# Patient Record
Sex: MALE | Race: WHITE | URBAN - METROPOLITAN AREA
[De-identification: names, ages, dates, MRNs, and addresses within clinical notes are randomized per-mention and may not be internally consistent; named-entity substitution may affect disease eponyms.]

---

## 2024-05-21 ENCOUNTER — TELEPHONE (OUTPATIENT)
Dept: SURGERY | Facility: CLINIC | Age: 39
End: 2024-05-21

## 2024-05-21 ENCOUNTER — OFFICE VISIT (OUTPATIENT)
Dept: SURGERY | Facility: CLINIC | Age: 39
End: 2024-05-21

## 2024-05-21 VITALS
SYSTOLIC BLOOD PRESSURE: 113 MMHG | HEART RATE: 60 BPM | HEIGHT: 65 IN | WEIGHT: 174 LBS | BODY MASS INDEX: 28.99 KG/M2 | DIASTOLIC BLOOD PRESSURE: 73 MMHG

## 2024-05-21 DIAGNOSIS — M47.26 OTHER SPONDYLOSIS WITH RADICULOPATHY, LUMBAR REGION: Primary | ICD-10-CM

## 2024-05-21 NOTE — PATIENT INSTRUCTIONS
Refill policies:    Allow 2-3 business days for refills; controlled substances may take longer.  Contact your pharmacy at least 5 days prior to running out of medication and have them send an electronic request or submit request through the “request refill” option in your [employer] account.  Refills are not addressed on weekends; covering physicians do not authorize routine medications on weekends.  No narcotics or controlled substances are refilled after noon on Fridays or by on call physicians.  By law, narcotics must be electronically prescribed.  A 30 day supply with no refills is the maximum allowed.  If your prescription is due for a refill, you may be due for a follow up appointment.  To best provide you care, patients receiving routine medications need to be seen at least once a year.  Patients receiving narcotic/controlled substance medications need to be seen at least once every 3 months.  In the event that your preferred pharmacy does not have the requested medication in stock (e.g. Backordered), it is your responsibility to find another pharmacy that has the requested medication available.  We will gladly send a new prescription to that pharmacy at your request.    Scheduling Tests:    If your physician has ordered radiology tests such as MRI or CT scans, please contact Central Scheduling at [phone] right away to schedule the test.  Once scheduled, the [facility] Centralized Referral Team will work with your insurance carrier to obtain pre-certification or prior authorization.  Depending on your insurance carrier, approval may take 3-10 days.  It is highly recommended patients assure they have received an authorization before having a test performed.  If test is done without insurance authorization, patient may be responsible for the entire amount billed.      Precertification and Prior Authorizations:  If your physician has recommended that you have a procedure or additional testing performed the [facility]  Centralized Referral Team will contact your insurance carrier to obtain pre-certification or prior authorization.    You are strongly encouraged to contact your insurance carrier to verify that your procedure/test has been approved and is a COVERED benefit.  Although the [facility] Centralized Referral Team does its due diligence, the insurance carrier gives the disclaimer that \"Although the procedure is authorized, this does not guarantee payment.\"    Ultimately the patient is responsible for payment.   Thank you for your understanding in this matter.  Paperwork Completion:  If you require FMLA or disability paperwork for your recovery, please make sure to either drop it off or have it faxed to our office at [phone]. Be sure the form has your name and date of birth on it.  The form will be faxed to our Forms Department and they will complete it for you.  There is a 25$ fee for all forms that need to be filled out.  Please be aware there is a 10-14 day turnaround time.  You will need to sign a release of information (JOSTIN) form if your paperwork does not come with one.  You may call the Forms Department with any questions at [phone].  Their fax number is [phone].      Dr. Mares recommends the following:    Start physical therapy for 6 weeks. To schedule Physical Therapy at any of the [facility], please call   [phone].     [facility] in [facility]  [address] [place] [address]            [facility] in [place]  [address] [place]  [address]    [facility] in [place]  [address] [place] [address]              [facility] in [place]  [address] [place] [address]    [facility] in [place]  [address] [place] [address]            **If you would prefer to do physical therapy at a different facility ([facility], [facility], etc.), please request an \"External\" physical therapy order from  your provider    2. Schedule an appointment with Physiatrist, Dr. Alex Behar by calling [phone] to discuss possible lumbar injections.     Follow up with Dr. Mares in 6 weeks.

## 2024-05-21 NOTE — H&P
MYA RED M.D., F.A.A.N.S.     of Neurosurgery  [facility]  Board Certified Neurosurgeon                              [facility]        [facility]      [address]  [address] [place] [id number]    PHONE  [phone]          FAX  [phone]    [email or web address]    Located within [facility] MEDICAL [facility], [facility], [facility]     OFFICE CONSULTATION          Cristobal Garcia  : 1985   MRN: [id number]    PCP: No primary care provider on file.  Referring Provider: No ref. provider found     Insurance: Payor: WORKERS COMP / Plan: WORKERS COMP / Product Type: *No Product type* /            Date of Consult:  2024    Reason for Consultation:  Our patient has been referred to our office for evaluation of: Lower back and bilateral lower extremity pain      History of Present Illness:  Cristobal Garcia is a a(n) right-handed, 39 year old, male.  This is a very pleasant gentleman who presents after a work-related accident on  which led to the acute onset of lower back and bilateral lower extremity discomfort.  He reports that he started physical therapy and had a nonsteroidal anti-inflammatory injection in the emergency room which was mildly helpful.  He continues to be symptomatic and is utilizing a cane for safety.  He denies any focal sensory changes or any focal weakness in his lower extremities.  He states that the pain radiates into the bilateral thighs posteriorly but does not really descend below.  He denies any acute bowel or bladder changes.        History:  No past medical history on file.   No past surgical history on file.  No family history on file.   Social History     Socioeconomic History    Marital status: Single     Spouse name: Not on file    Number of children: Not on file    Years of education: Not on file    Highest education level: Not on file   Occupational History    Not  on file   Tobacco Use    Smoking status: Not on file    Smokeless tobacco: Not on file   Substance and Sexual Activity    Alcohol use: Not on file    Drug use: Not on file    Sexual activity: Not on file   Other Topics Concern    Not on file   Social History Narrative    Not on file     Social Determinants of Health     Financial Resource Strain: Not on file   Food Insecurity: Not on file   Transportation Needs: Not on file   Physical Activity: Not on file   Stress: Not on file   Social Connections: Not on file   Housing Stability: Low Risk  (1/29/2022)    Received from [facility]    Housing Stability     Mortgage Payment Concerns?: Not on file     Number of Places Lived in the Last Year: Not on file     Unstable Housing?: Not on file        Allergies:  Not on File    Medications:  No current outpatient medications on file.        Review of Systems:  A 10-point system was reviewed.  Pertinent positives and negatives are noted in HPI.      Physical Exam:  /73   Pulse 60   Ht 65\"   Wt 174 lb (78.9 kg)   BMI 28.96 kg/m²        Neurological Exam:    Motor Strength:  5/5 AMG and symmetric    Sensation to light touch:  Intact and symmetric in to light touch in the lower extremities    DTRs:  2+/4 in patellar distribution    Long tract signs:  Negative roberts  Negative babinski  Negative clonus      Abdomen:  Soft, non-distended, non-tender, with no rebound or guarding.  No peritoneal signs.     Extremities:  Non-tender, no lower extremity edema noted.      Labs:  CBC:  No results found for: \"WBC\", \"HGB\", \"HEMOGLOBIN\", \"HCT\", \"MCV\", \"PLT\"   BMG:   No results found for: \"GLUF\", \"NA\", \"K\", \"CO2\", \"CL\", \"BUN\", \"CREATININE\"   INR:   No results found for: \"INR\", \"PROTIME\"     Diagnostics:  I reviewed an MRI of the lumbar spine without contrast.  There is proper alignment of the lumbar vertebral bodies with some loss of regional lumbar lordosis.  There is degenerative disc disease at L4-5 with loss  of high signal and a left-sided disc herniation causing significant left lateral recess stenosis.     Diagnosis:  1. Other spondylosis with radiculopathy, lumbar region        Assessment/Plan:  Cristobal Garcia is a a(n) 39 year old, male, presents with an L4-5 disc herniation and significant left lateral recess stenosis at L4-5.  His symptomatology at this point in time is predominantly mechanical and I have discussed with him that physical therapy and over-the-counter nonsteroidal anti-inflammatories should be the mainstay treatment at this time.  We will also provide him with a referral to Dr. Behar for a possible epidural steroid injection.  We will provide a physical therap order and have him follow-up in 6 weeks.  If he continues to be symptomatic he may be a candidate for a surgical intervention at that time.  He is off work for now.    All questions and concerns were addressed. We appreciate the opportunity to participate in the care of this patient. Please do not hesitate to call our office ([phone]) with any issues.   This report will be submitted to the referring provider.          Lawson Mares M.D., F.A.A.N.S.    5/21/2024  11:13 AM    This note has been dictated utilizing voice recognition software. Unfortunately, this may lead to occasional typographical errors. If there are any questions regarding this, please do not hesitate to contact our office.

## 2024-05-21 NOTE — TELEPHONE ENCOUNTER
Patient came in office today for office visit with Dr. Mares. Patient brought in disc, MRI L/Spine, LBP Radiculopathy dated on 5/14/24. Disc was downloaded via PAC's and handed back to patient.

## 2024-05-21 NOTE — PROGRESS NOTES
Most recent imaging dated on: Disc via PAC's.   Pain Level: 8/10. Patient states that they are having pain located on their lower back and both legs.   Numbness / Tingling: Patient denies numbness and tingling.   Physical Therapy / Injections: Patient has been compliant with Physical Therapy / Injections, 1.

## 2024-05-28 ENCOUNTER — TELEPHONE (OUTPATIENT)
Dept: SURGERY | Facility: CLINIC | Age: 39
End: 2024-05-28

## 2024-05-28 NOTE — TELEPHONE ENCOUNTER
[place] office received a fax from [employer] requesting medical records.     Claim # [id number]  Date of Injury: 04/26/2024    Documents successfully faxed to our Medical records office at [phone].

## 2024-07-18 ENCOUNTER — OFFICE VISIT (OUTPATIENT)
Dept: PHYSICAL MEDICINE AND REHAB | Facility: CLINIC | Age: 39
End: 2024-07-18
Payer: OTHER MISCELLANEOUS

## 2024-07-18 VITALS — BODY MASS INDEX: 28.99 KG/M2 | HEIGHT: 65 IN | WEIGHT: 174 LBS

## 2024-07-18 DIAGNOSIS — M51.16 LUMBAR DISC HERNIATION WITH RADICULOPATHY: Primary | ICD-10-CM

## 2024-07-18 PROCEDURE — 99204 OFFICE O/P NEW MOD 45 MIN: CPT | Performed by: PHYSICAL MEDICINE & REHABILITATION

## 2024-07-18 RX ORDER — METHYLPREDNISOLONE 4 MG/1
TABLET ORAL
Qty: 1 EACH | Refills: 0 | Status: SHIPPED | OUTPATIENT
Start: 2024-07-18

## 2024-07-18 NOTE — H&P
History and Physical    C/C:   Chief Complaint   Patient presents with    New Patient     New patient is here with complaints of back pain. Was referred by Dr. Mares.Reports pain to be a constant aching pain and comes whenever he's active and or standing up. States he has to sit or less the pain will then comes. Pain started April 25th due to an injury while at work,( pulling boxes) and thinks he pulled something. Not taking any pain meds or muscle relaxer's. See's a chiropractor.      HPI: 39-year-old male presents with back pain that occurred after a work-related incident on 4/25/24. 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 No previous low back pain prior to the work related incident. Currently in physical therapy with some improvement. Pain with walking. Right sided buttock/lateral thigh pain with walking. Back pain increases with sitting.  Not having any left leg symptoms.  No numbness or tingling in the leg or foot.  No weakness. Has been off work since the incident. Referred by Dr. Mares for consideration of epidural steroid injection. Has not been able to follow up with Dr. Mares due to lack of workers' comp approval. Does physical therapy through a chiropractor's office.     Allergies: NKDA    Pertinent ROS: Denies fever, chills, unintended weight loss, night sweats     Physical exam:  Ht 65\"   Wt 174 lb (78.9 kg)   BMI 28.96 kg/m²     Lumbar spine exam:    No skin rash lumbar/upper sacral region  Mild low pain with lumbar flexion. No pain with lumbar extension.  No tenderness to palpation bilateral lumbar paraspinals. No ttp bilateral PSIS.  5/5 LE strength b/l in HF, KE, ADF, EHL, ankle eversion, PF  SILT b/l LE  2/4 quad, gastrocs reflexes b/l  Seated slump test provokes back pain only  SLR left results in left leg pain. SLR right results in midline low back pain    Imaging: MRI lumbar spine dated 5/14/2024 independently reviewed as was report. Left paracentral L4-5  protrusion, with bright T2 signal of the posterior disc.    Assessment and plan:  Lumbar disc herniation with radiculopathy    Has been improving with PT. Though he has provoked pain on left SLR he does not report left leg pain, and reports right lateral leg pain which is unexpected given the location of the disc herniation.  We discussed epidural steroid injection and the risks, including bleeding, infection, nerve injury, HA; elects to proceed. He is very much wanting to avoid a procedure. He was prescribed a medrol dosepack and 4 more weeks of physical therapy. If no improvement we can revisit L5-S1 interlaminar epidural steroid injection under fluoroscopy.     F/u in 2-4 weeks. Since he has been unable to follow up with Dr. Mares I gave him a note to keep him off work until he sees me for follow up.     Demarco Dixon DO  Physical Medicine and Rehabilitation  [facility]

## 2024-07-23 ENCOUNTER — TELEPHONE (OUTPATIENT)
Dept: PHYSICAL MEDICINE AND REHAB | Facility: CLINIC | Age: 39
End: 2024-07-23

## 2024-07-31 ENCOUNTER — TELEPHONE (OUTPATIENT)
Dept: PHYSICAL MEDICINE AND REHAB | Facility: CLINIC | Age: 39
End: 2024-07-31

## 2024-07-31 NOTE — TELEPHONE ENCOUNTER
Rosario from Dr. Duval's office (Dr. Erick Duval-[facility]ropractor) calling to request an extension letter. Patient just started PT on Monday - 07/29/2024. Dr. Duval's office is asking for a 2 week extension to be off of work from this date above. Could not find the letter Dr. Dixon wrote to be off of work. However, Dr. Dixon stated in his LOV note: \"Since he has been unable to follow up with Dr. Mares I gave him a note to keep him off work until he sees me for follow up.\"    Once letter is completed, please send to Fax: [phone]

## 2024-08-01 NOTE — TELEPHONE ENCOUNTER
OK to extend patient to be off work until 8/12; please have him follow up with me sometime that same week.

## 2024-08-08 ENCOUNTER — TELEPHONE (OUTPATIENT)
Dept: PHYSICAL MEDICINE AND REHAB | Facility: CLINIC | Age: 39
End: 2024-08-08

## 2024-08-13 ENCOUNTER — OFFICE VISIT (OUTPATIENT)
Dept: PHYSICAL MEDICINE AND REHAB | Facility: CLINIC | Age: 39
End: 2024-08-13
Payer: OTHER MISCELLANEOUS

## 2024-08-13 VITALS — HEIGHT: 65 IN | BODY MASS INDEX: 28.99 KG/M2 | WEIGHT: 174 LBS

## 2024-08-13 DIAGNOSIS — M51.16 LUMBAR DISC HERNIATION WITH RADICULOPATHY: Primary | ICD-10-CM

## 2024-08-13 RX ORDER — MELOXICAM 15 MG/1
15 TABLET ORAL DAILY
Qty: 14 TABLET | Refills: 0 | Status: SHIPPED | OUTPATIENT
Start: 2024-08-13

## 2024-08-13 NOTE — PROGRESS NOTES
Progress note    C/C:   Chief Complaint   Patient presents with    Follow - Up     LOV 7/18/24 pt is here for a follow up. No n/t. Not taking any pain  meds or muscle relaxers. Currently in physical therapy and states he's getting relief. Pain 7/10      HPI: 39 year old male presents for follow up. Improvement since last visit. He continues to have low back and right leg with prolonged sitting, back greater than leg pain. Has been in physical therapy since the last time I saw him. Finished last scheduled session of physical therapy. No n/t in the right leg.     Pertinent allergies: Not on File     Physical exam:  Ht 65\"   Wt 174 lb (78.9 kg)   BMI 28.96 kg/m²      Lumbar spine exam:    Mild low pain with lumbar flexion. No pain with lumbar extension.  No tenderness to palpation bilateral lumbar paraspinals. No ttp bilateral PSIS.  5/5 LE strength b/l in HF, KE, ADF, EHL, ankle eversion, PF  SILT b/l LE  2/4 quad, gastrocs reflexes b/l  Seated slump test provokes back pain only  SLR negative b/l    Imaging: No new imaging to review    Assessment and plan  Lumbar disc herniation with radiculopathy    Despite reporting 7/10 pain today he reports some reduction in right leg pain. At this point I recommend meloxicam 15 mg daily for 2 weeks, and work conditioning 3 to 4 days a week for 3 weeks.  If doing well enough can return the patient to light duty or full duty.  He should see me once the work conditioning is done. Remain off work for now.     Demarco Dixon DO  Physical Medicine and Rehabilitation  [facility]

## 2024-08-23 ENCOUNTER — MED REC SCAN ONLY (OUTPATIENT)
Dept: PHYSICAL MEDICINE AND REHAB | Facility: CLINIC | Age: 39
End: 2024-08-23

## 2024-09-09 ENCOUNTER — TELEPHONE (OUTPATIENT)
Dept: PHYSICAL MEDICINE AND REHAB | Facility: CLINIC | Age: 39
End: 2024-09-09

## 2024-09-09 NOTE — TELEPHONE ENCOUNTER
Pt referring physician calling to request work letter be faxed to [phone] once available. Pt does not have [employer] and not able to come in to get it.

## 2024-09-09 NOTE — TELEPHONE ENCOUNTER
Pt has questions regarding work note: pt says his back pain is still the same, therapy is not helping much with his pain. Work note currently says he can return to work on 9/13. However, his next follow up appointment is for 9/24. Pt is Colombian speaking. Please advise, thank you.

## 2024-09-09 NOTE — TELEPHONE ENCOUNTER
Incoming call from patient (language line solutions used at this time), patient has NOV: 09/24/2024. Pt reports compliance with PT & HEP. Pt reporting that during PT pt was made to carry 25lbs per UE Patient reports continued pain to lower back. Pt reporting that the Meloxicam was not helpful for the pain.      Pt requesting letter to remain off of work until he sees Dr. Dixon again on 09/24/2024 to maintain benefits for work.       Letter pended for Dr. Dixon's review/signature. Patient will  in office once letter is completed/signed.

## 2024-09-10 NOTE — TELEPHONE ENCOUNTER
Signed work letter uploaded to patient's chart.   Signed letter faxed to number given by patient previously: [phone].

## 2024-09-24 ENCOUNTER — OFFICE VISIT (OUTPATIENT)
Dept: PHYSICAL MEDICINE AND REHAB | Facility: CLINIC | Age: 39
End: 2024-09-24
Payer: OTHER MISCELLANEOUS

## 2024-09-24 ENCOUNTER — TELEPHONE (OUTPATIENT)
Dept: PHYSICAL MEDICINE AND REHAB | Facility: CLINIC | Age: 39
End: 2024-09-24

## 2024-09-24 VITALS — HEIGHT: 65 IN | WEIGHT: 175 LBS | BODY MASS INDEX: 29.16 KG/M2

## 2024-09-24 DIAGNOSIS — M51.16 LUMBAR DISC HERNIATION WITH RADICULOPATHY: Primary | ICD-10-CM

## 2024-09-24 PROCEDURE — 99214 OFFICE O/P EST MOD 30 MIN: CPT | Performed by: PHYSICAL MEDICINE & REHABILITATION

## 2024-09-24 RX ORDER — GABAPENTIN 300 MG/1
CAPSULE ORAL
Qty: 60 CAPSULE | Refills: 0 | Status: SHIPPED | OUTPATIENT
Start: 2024-09-24

## 2024-09-24 NOTE — PROGRESS NOTES
Progress note    C/C:   Chief Complaint   Patient presents with    Follow - Up     LOV: 8/13/2024 pt comes in with L side low back pain. Denies T/N. Denies weakness. Denies weakness. Rates pain 8/10. Denies medication. Completed work conditioning.      HPI: 39-year-old male presents for follow-up.  Underwent work conditioning.  He has seen some improvement. He still has difficulty with prolonged sitting due to back pain, but minimal to no leg pain. With walking and climbing stairs the back does not feel quite right. With forward bending has increased low back pain.     Pertinent allergies: Not on File     Physical exam:  Ht 65\"   Wt 175 lb (79.4 kg)   BMI 29.12 kg/m²      Lumbar spine exam:    No skin rash lumbar/upper sacral region  No pain with lumbar flexion. No pain with lumbar extension.  5/5 LE strength b/l  SILT b/l LE  2/4 quad, gastrocs reflexes b/l  Seated slump test + for low back pain    Imaging: No new imaging to review    Assessment and plan  Lumbar disc herniation with radiculopathy    The symptoms have for the most part centralized, but he still cannot tolerate tasks that load the lumbar disc.  This has been going on long enough where we should consider left L5 transforaminal epidural steroid injection under fluoroscopy. We discussed the risks of procedure, including bleeding, infection, nerve injury, HA; he is hesitant but elects to proceed.  In the meantime he will take gabapentin 300 mg nightly for 1 week, then 300 mg twice daily if no side effects and no benefit.  On the off chance that the gabapentin greatly helps he may postpone the procedure and come see me in 4 weeks time.  If no significant improvement with the gabapentin then he should we should proceed with left L5 transforaminal epidural steroid injection, and then he should be seen for follow-up 2 weeks after the procedure is done.    Demarco Dixon DO  Physical Medicine and Rehabilitation  [facility]

## 2024-09-24 NOTE — TELEPHONE ENCOUNTER
Initiated authorization for Left L5 transforaminal epidural steroid injection under fluoroscopy, local or local + oral valium. CPT/HCPCS 79851 dx:M51.16 to be done at [facility]    Clinicals along with the order have been faxed to the patients W/C Hedy esquivel to [phone]    Status: Pending  Case # [id number]  DOI: 4/26/24  Telephone# [phone]  Fax # [phone]

## 2024-09-24 NOTE — PATIENT INSTRUCTIONS
1) We will call you once we have insurance approval.   2) Hold off on physical therapy for now.   3) Come and see me for follow up 2 weeks after the injection is done.

## 2024-10-07 PROBLEM — M51.16 LUMBAR DISC HERNIATION WITH RADICULOPATHY: Status: ACTIVE | Noted: 2024-10-07

## 2024-10-08 ENCOUNTER — TELEPHONE (OUTPATIENT)
Dept: PHYSICAL MEDICINE AND REHAB | Facility: CLINIC | Age: 39
End: 2024-10-08

## 2024-10-08 NOTE — TELEPHONE ENCOUNTER
Patient is asking for a \"work note\" status.  He stated he asked Dr Dixon for it during his visit however pt did not receive it. Patient was told MD is on vacation and wont be back until Oct 11th.

## 2024-10-09 ENCOUNTER — TELEPHONE (OUTPATIENT)
Dept: PHYSICAL MEDICINE AND REHAB | Facility: CLINIC | Age: 39
End: 2024-10-09

## 2024-10-09 NOTE — TELEPHONE ENCOUNTER
Patient calling to report side effects from the injections.    - Patient had Left L5 transforaminal epidural steroid injection on 10/07/2024 with Dr. Dixon.    Starting on Monday, a couple hours after the injection - patient reporting long lasting hiccups, lasting for an hour or so and only subsiding for 5-6 minutes at a time. Patient also reporting a chest/sternum burning. Patient thought this was heartburn and took some Tums for this medication.        Informed patient that hiccups is a rare, but benign side effect of steroid injections and should subside after a couple of days. However, if they do not, feel free to give us a callback to report his symptoms. Informed patient that heartburn is a more common side effect, that if he continues to feel this, he may feel free to take Tums as instructed by the packaging. Patient verbalized understanding and was thankful.

## 2024-10-11 NOTE — TELEPHONE ENCOUNTER
Pt is calling back to follow up on work status note as this is needed in order to continue receiving pay. Please advise, thank you.

## 2024-10-14 NOTE — TELEPHONE ENCOUNTER
OK to provide work status update stating patient underwent procedure and is scheduled for follow up on 11/5. Should be off work until seen for scheduled follow up.

## 2024-10-14 NOTE — TELEPHONE ENCOUNTER
Work Letter created & signed by Dr. Dixon at this time.     Signed letter can be found in media tab.

## 2024-10-14 NOTE — TELEPHONE ENCOUNTER
S/w patient via [employer]. Patient informed this RN that he would like it faxed to #[phone]. Faxed at this time as requested.    Patient will also  this letter tomorrow at the . Signed letter placed at the  for patient .

## 2024-10-17 ENCOUNTER — TELEPHONE (OUTPATIENT)
Dept: PHYSICAL MEDICINE AND REHAB | Facility: CLINIC | Age: 39
End: 2024-10-17

## 2024-11-05 ENCOUNTER — OFFICE VISIT (OUTPATIENT)
Dept: PHYSICAL MEDICINE AND REHAB | Facility: CLINIC | Age: 39
End: 2024-11-05
Payer: OTHER MISCELLANEOUS

## 2024-11-05 VITALS — HEART RATE: 68 BPM | WEIGHT: 175 LBS | OXYGEN SATURATION: 97 % | BODY MASS INDEX: 29 KG/M2

## 2024-11-05 DIAGNOSIS — M51.16 LUMBAR DISC HERNIATION WITH RADICULOPATHY: Primary | ICD-10-CM

## 2024-11-05 PROCEDURE — 99214 OFFICE O/P EST MOD 30 MIN: CPT | Performed by: PHYSICAL MEDICINE & REHABILITATION

## 2024-11-05 NOTE — PROGRESS NOTES
Progress note    C/C:   Chief Complaint   Patient presents with    Follow - Up     10/07/24 Left L5 TFESI. 09/24/24 LOV. States 90% relief after injection. He still has pain when he is sitting on the floor. Denies t/n or burning. No pain. Mobic daily.       HPI: 39-year-old male presents for follow-up.  Underwent left L5 transforaminal epidural steroid injection under fluoroscopy on 10/7/2024. Significant improvement as per above. Has lifted objects about 20 pounds at home after injection, mild discomfort. Denies radiation into the bilateral lower extremities, no n/t in the legs or the feet. When sitting on the floor has increasing low back pain.     Pertinent allergies: Allergies[1]     Physical exam:  Pulse 68   Wt 175 lb (79.4 kg)   SpO2 97%   BMI 29.12 kg/m²      Lumbar spine exam:    No skin rash lumbar/upper sacral region  No pain with lumbar flexion. No pain with lumbar extension.  5/5 LE strength b/l in HF, KE, ADF, EHL, ankle eversion  SILT b/l LE  2/4 quad, gastrocs reflexes b/l  Seated slump test provokes low back pain only  SLR negative b/l    Imaging: No new imaging to review    Assessment and plan  Lumbar disc herniation with radiculopathy, improved    Improved, though still difficulty tolerating weight.  Recommend work conditioning for 3 weeks, follow-up afterwards.  Remain off work.    30 minutes spent precharting, conducting H&P, discussing treatment options, completing documentation.       [1] No Known Allergies

## 2025-01-28 ENCOUNTER — OFFICE VISIT (OUTPATIENT)
Dept: PHYSICAL MEDICINE AND REHAB | Facility: CLINIC | Age: 40
End: 2025-01-28
Payer: OTHER MISCELLANEOUS

## 2025-01-28 VITALS — WEIGHT: 175 LBS | BODY MASS INDEX: 29.16 KG/M2 | HEIGHT: 65 IN

## 2025-01-28 DIAGNOSIS — M54.16 RIGHT LUMBAR RADICULITIS: Primary | ICD-10-CM

## 2025-01-28 DIAGNOSIS — M51.16 LUMBAR DISC HERNIATION WITH RADICULOPATHY: ICD-10-CM

## 2025-01-28 PROCEDURE — 99214 OFFICE O/P EST MOD 30 MIN: CPT | Performed by: PHYSICAL MEDICINE & REHABILITATION

## 2025-01-28 RX ORDER — MELOXICAM 15 MG/1
15 TABLET ORAL DAILY
Qty: 14 TABLET | Refills: 0 | Status: SHIPPED | OUTPATIENT
Start: 2025-01-28

## 2025-01-28 NOTE — PROGRESS NOTES
Progress note    C/C:   Chief Complaint   Patient presents with    Follow - Up     LOV 11/5/24. F/U for low back pain. Pain 8/10. Admits N/T in R leg. Denies weakness. No pain meds. Pt hurt back more during last PT sessions.      HPI: 39 year old male presents for follow up. Has had worsening pain following work conditioning. Aggravated right lower back and leg pain down to the lateral aspect of the right lower leg with associated numbness and tingling that worsens with standing, walking, less so with sitting. No weakness.     Pertinent allergies: Allergies[1]     Physical exam:  There were no vitals taken for this visit.     Lumbar spine exam:    No skin rash lumbar/upper sacral region  mild pain with lumbar flexion. + pain with lumbar extension. Extension provokes right leg and lower back pain  No tenderness to palpation bilateral lumbar paraspinals. No ttp bilateral PSIS.  5/5 LE strength b/l in HF, KE, ADF, ankle inversion, ankle eversion, PF  SILT b/l LE  2/4 quad, gastrocs reflexes b/l  Facet loading +  SLR right results in back pain only    Imaging: No new imaging to review; that being said his previous MRI from April of 2024 demonstrated increased T2 signal within both facet joints, along with LEFT L4-5 paracentral protrusion.    Assessment and plan  Right lumbar radiculitis  Lumbar facet arthropathy    Aggravated right leg pain following work conditioning. Structurally there does not appear to be a cause for radicular symptoms on his MRI; that being said there is facet hypertrophy and increased signal within both facet joints. Recommend XR flex/ex views to assess for stability. In the meantime recommend meloxicam 15mg qDaily x14 days, and if no better L5-S1 interlaminar epidural steroid injection under fluoroscopy, local. Afterwards may need to consider FCE.     Demarco Dixon DO  Physical Medicine and Rehabilitation  [facility]       [1] No Known Allergies

## 2025-01-29 ENCOUNTER — TELEPHONE (OUTPATIENT)
Dept: PHYSICAL MEDICINE AND REHAB | Facility: CLINIC | Age: 40
End: 2025-01-29

## 2025-01-29 NOTE — TELEPHONE ENCOUNTER
Initiated authorization for L5-S1 interlaminar epidural steroid injection under fluoroscopy. CPT/[facility] 85392 dx:M54.16 to be done at [facility]     Order and clinicals have been faxed to the patients W/C agent:  Hedy Kim   Case # [id number]  DOI: 4/26/24  Telephone# [phone]  Fax # [phone]

## 2025-02-03 NOTE — TELEPHONE ENCOUNTER
Reviewed the approval letter via the Media tab.    Status: Approved  Reference/Authorization # [id number]  Authorization is not a guarantee of payment and may be subject to review once claim is submitted.

## 2025-02-10 PROBLEM — M54.16 RIGHT LUMBAR RADICULITIS: Status: ACTIVE | Noted: 2025-02-10

## 2025-02-13 ENCOUNTER — TELEPHONE (OUTPATIENT)
Dept: NEUROLOGY | Facility: CLINIC | Age: 40
End: 2025-02-13

## 2025-02-19 ENCOUNTER — TELEPHONE (OUTPATIENT)
Dept: PHYSICAL MEDICINE AND REHAB | Facility: CLINIC | Age: 40
End: 2025-02-19

## 2025-02-25 ENCOUNTER — OFFICE VISIT (OUTPATIENT)
Dept: PHYSICAL MEDICINE AND REHAB | Facility: CLINIC | Age: 40
End: 2025-02-25
Payer: OTHER MISCELLANEOUS

## 2025-02-25 VITALS — BODY MASS INDEX: 29.16 KG/M2 | WEIGHT: 175 LBS | HEIGHT: 65 IN

## 2025-02-25 DIAGNOSIS — M47.816 LUMBAR FACET ARTHROPATHY: Primary | ICD-10-CM

## 2025-02-25 PROCEDURE — 99214 OFFICE O/P EST MOD 30 MIN: CPT | Performed by: PHYSICAL MEDICINE & REHABILITATION

## 2025-02-25 NOTE — PROGRESS NOTES
Progress note    C/C:   Chief Complaint   Patient presents with    Follow - Up     LOV 1/28/25. L5-S1 interlaminar epidural steroid injection on 2/10/25. Pt claims very little improvement following injection. Pain 8/10. Denies N/T, weakness. Pt takes tylenol prn. No recent PT.      HPI: 39 year old male presents for follow up. On further questioning while having low back pain the right leg pain went away following L5-S1 ILESI. Continues to have constant right lower back pain. Increases with prolonged sitting, movement. Has not been walking. Voices frustration with the workers' comp process and wants to return to work, but concerned of provoking increasing lower back pain and is seeking further treatment.     Pertinent allergies: Allergies[1]     Physical exam:  Ht 65\"   Wt 175 lb (79.4 kg)   BMI 29.12 kg/m²      Lumbar spine exam:    + pain with lumbar flexion. + pain with lumbar extension. Both equally painful.   5/5 LE strength b/l  SILT b/l LE  2/4 quad, gastrocs reflexes b/l  Facet loading + b/l  Seated slump test negative  SLR negative b/l    Imaging: MRI lumbar spine from May 2024 was re-reviewed. There is increased T2 facet signal of the L3-4, L4-5 facet joints, along with left L4-5 paracentral protrusion.     Assessment and plan  Lumbar facet arthropathy  Right lumbar radiculitis, radicular symptoms resolved    For the remaining axial low back pain recommend bilateral L3-4, L4-5 facet joint steroid injections under fluoroscopy, local with or without oral valium. We discussed the risks of procedure, including bleeding, infection, nerve injury, HA; elects to proceed. After injection is done can consider returning him to work; remain off work for now.     Follow up for injection.     Demarco Dixon DO  Physical Medicine and Rehabilitation  [facility]       [1] No Known Allergies

## 2025-02-26 ENCOUNTER — TELEPHONE (OUTPATIENT)
Dept: PHYSICAL MEDICINE AND REHAB | Facility: CLINIC | Age: 40
End: 2025-02-26

## 2025-02-26 NOTE — TELEPHONE ENCOUNTER
Initiated authorization for Bilateral L3-4, L4-5 facet joint steroid injections under fluoroscopy. [facility]'s 2 with the patients W/C agent:    Hedy Tiffanyteresa  Case # [id number]  DOI: 4/26/24  Telephone# [phone]  Fax # [phone]  Status: Pending

## 2025-03-12 ENCOUNTER — TELEPHONE (OUTPATIENT)
Dept: PHYSICAL MEDICINE AND REHAB | Facility: CLINIC | Age: 40
End: 2025-03-12

## 2025-03-17 PROBLEM — M47.816 LUMBAR FACET ARTHROPATHY: Status: ACTIVE | Noted: 2025-03-17

## 2025-03-19 ENCOUNTER — TELEPHONE (OUTPATIENT)
Dept: PHYSICAL MEDICINE AND REHAB | Facility: CLINIC | Age: 40
End: 2025-03-19

## 2025-03-19 NOTE — TELEPHONE ENCOUNTER
W/C adjustor calling from [employer] to obtain office visit notes and work status. From procedure March 17th. Will will over today.

## 2025-03-20 ENCOUNTER — TELEPHONE (OUTPATIENT)
Dept: PHYSICAL MEDICINE AND REHAB | Facility: CLINIC | Age: 40
End: 2025-03-20

## 2025-03-20 NOTE — TELEPHONE ENCOUNTER
Workmans Comp notes faxed to adjustor:  From procedure on March 17th    Hedy Palomogwick  [phone]

## 2025-04-15 ENCOUNTER — OFFICE VISIT (OUTPATIENT)
Dept: PHYSICAL MEDICINE AND REHAB | Facility: CLINIC | Age: 40
End: 2025-04-15
Payer: OTHER MISCELLANEOUS

## 2025-04-15 VITALS
WEIGHT: 175 LBS | HEIGHT: 65 IN | BODY MASS INDEX: 29.16 KG/M2 | SYSTOLIC BLOOD PRESSURE: 134 MMHG | DIASTOLIC BLOOD PRESSURE: 82 MMHG

## 2025-04-15 DIAGNOSIS — M54.41 CHRONIC BILATERAL LOW BACK PAIN WITH RIGHT-SIDED SCIATICA: Primary | ICD-10-CM

## 2025-04-15 DIAGNOSIS — G89.29 CHRONIC BILATERAL LOW BACK PAIN WITH RIGHT-SIDED SCIATICA: Primary | ICD-10-CM

## 2025-04-15 PROCEDURE — 99214 OFFICE O/P EST MOD 30 MIN: CPT | Performed by: PHYSICAL MEDICINE & REHABILITATION

## 2025-04-15 RX ORDER — GABAPENTIN 300 MG/1
CAPSULE ORAL
Qty: 60 CAPSULE | Refills: 0 | Status: SHIPPED | OUTPATIENT
Start: 2025-04-15

## 2025-04-15 NOTE — PROGRESS NOTES
The following individual(s) verbally consented to be recorded using ambient AI listening technology and understand that they can each withdraw their consent to this listening technology at any point by asking the clinician to turn off or pause the recording:    Patient name: Cristobal Garcia

## 2025-04-15 NOTE — PROGRESS NOTES
The following individual(s) verbally consented to be recorded using ambient AI listening technology and understand that they can each withdraw their consent to this listening technology at any point by asking the clinician to turn off or pause the recording:    Patient name: Cristobal Garcia     Progress note    C/C:   Chief Complaint   Patient presents with    Follow - Up     LOV 02/25/2025. Pt here for f/u on BL L3/4, L4/5 facet joint injections completed 3/17/25 and reports no improvement. Pain is 9/10. Denies N/T. Admits weakness. Denies medications. States wanting to discuss ordering MRI.         History of Present Illness  The patient presents with persistent low back pain and discomfort across the waistline despite recent facet joint injections. He describes the pain as 'like something is cutting in the inside,' and it is exacerbated by sitting for more than 20 minutes, bending over, and standing for prolonged periods. He denies numbness or tingling in the legs or feet. The pain in his right leg, which was previously a significant issue, has resolved following the second injection. The primary issue now is the axial low back pain, which significantly limits his mobility and ability to perform daily activities.    Pertinent allergies: Allergies[1]     Physical exam:  /82 (BP Location: Right arm, Patient Position: Sitting, Cuff Size: adult)   Ht 65\"   Wt 175 lb (79.4 kg)   BMI 29.12 kg/m²      Lumbar spine exam:    No skin rash lumbar/upper sacral region  + pain with lumbar flexion. + pain with lumbar extension. Extension is more painful than flexion  + tenderness to palpation bilateral lower lumbar paraspinals. No ttp bilateral PSIS.  5/5 LE strength b/l in HF, KE, ADF, EHL, ankle eversion  SILT b/l LE  2/4 quad, gastrocs reflexes b/l  Facet loading + b/l  Seated slump test negative    IMAGING: No new imaging to review  Assessment & Plan  Lumbar facet arthropathy    Lumbar disc herniation,  previously with radiculopathy in the right lower extremity    Chronic lumbar facet arthropathy persists with back pain unrelieved by previous injections. Gabapentin is proposed for pain management, 300mg at bedtime x1 week, then 300mg BID if no side effects and no benefit. Order lumbar spine X-rays with flexion and extension views and an MRI of the lumbar spine without contrast.     F/u after imaging is done. Remain off work.     Demarco Dixon DO  Physical Medicine and Rehabilitation  [facility]         [1] No Known Allergies

## 2025-04-16 ENCOUNTER — TELEPHONE (OUTPATIENT)
Dept: PHYSICAL MEDICINE AND REHAB | Facility: CLINIC | Age: 40
End: 2025-04-16

## 2025-04-16 NOTE — TELEPHONE ENCOUNTER
Clinicals and order for the MRI of the L spine have been e-mailed to the patients workman's comp agent, Hedy Kim.

## 2025-04-17 ENCOUNTER — TELEPHONE (OUTPATIENT)
Dept: PHYSICAL MEDICINE AND REHAB | Facility: CLINIC | Age: 40
End: 2025-04-17

## 2025-04-18 NOTE — TELEPHONE ENCOUNTER
Received approval for the MRI of the L spine from the patients W/C agent.  Hedy Kim   Case # [id number]  DOI: 4/26/24  Telephone# [phone]    Approval e-mail has been placed under the Media tab.  Message has been sent to the patient via [email or web address].

## 2025-05-22 NOTE — PATIENT INSTRUCTIONS
Wean off the gabapentin first.     Take 1 capsule at night time for 3 nights, then stop.     Once you have stopped the gabapentin, start the duloxetine once daily in the morning.     Let me know how you are doing in 4 weeks through telephone call or [employer] message; I may have you increase the dose if no side effects and no benefit.

## 2025-05-22 NOTE — PROGRESS NOTES
The following individual(s) verbally consented to be recorded using ambient AI listening technology and understand that they can each withdraw their consent to this listening technology at any point by asking the clinician to turn off or pause the recording:    Patient name: Cristobal Garcia  Additional names:  WADE

## 2025-05-22 NOTE — PROGRESS NOTES
The following individual(s) verbally consented to be recorded using ambient AI listening technology and understand that they can each withdraw their consent to this listening technology at any point by asking the clinician to turn off or pause the recording:    Patient name: Cristobal Garcia     Progress note    C/C:   Chief Complaint   Patient presents with    Follow - Up        History of Present Illness  Cristobal Garcia is a 40 year old male who presents with chronic lower back pain.    He experiences persistent lower back pain that does not radiate to the legs. The pain is constant and worsens with movements such as bending backwards and twisting, causing a sensation of pressure. He has difficulty maintaining positions like sitting or standing for extended periods and requires support when standing. He cannot bend over or sit for more than ten minutes without discomfort. Localizes low back pain above the waistline.    Gabapentin, taken twice daily, has not alleviated his pain and causes significant side effects, including nausea and a sensation of chest pressure. No vomiting occurs, but nausea is present.    Previous imaging studies revealed a left paracentral L4-5 protrusion. Despite physical therapy, he continues to experience significant limitations in daily activities due to his back pain, though the more recent MRI demonstrates anatomic interval improvement (see below). He is not currently taking any other medications apart from gabapentin.    Since the last time I saw him he underwent an LALY; the LALY recommended that he return to work full duty.     Pertinent allergies: Allergies[1]     Physical exam:  There were no vitals taken for this visit.     Lumbar spine exam:    No skin rash lumbar/upper sacral region  + pain with lumbar flexion. + pain with lumbar extension.  + tenderness to palpation bilateral lower lumbar paraspinals. No ttp bilateral PSIS.  5/5 LE strength b/l  SILT  b/l LE  2/4 quad, gastrocs reflexes b/l  Facet loading + b/l    IMAGING: MRI lumbar spine dated 5/19/2025 independently reviewed, as well as report.  Diffuse disc bulge mildly eccentric to the left without appreciable nerve root contact.  In comparison to an MRI of the lumbar spine dated 5/14/2024 the disc pathology actually looks improved; at that time there was a left paracentral protrusion at L4-5.  Assessment & Plan  Chronic axial low back pain  Chronic lower back pain persists despite anatomical improvement seen in imaging, likely due to central sensitization. We had a lengthy discussion, reviewing his current and previous imaging, discussing how to manage his ongoing symptoms that again appear to be from sensitization. Gabapentin was ineffective and caused significant side effects, including nausea, chest pressure, and food aversion, leading to its discontinuation. He will taper off gabapentin by taking one capsule at night for three nights before stopping. Duloxetine will be initiated 30mg once daily in the morning after gabapentin is stopped. Physical therapy exercises will continue, and a functional capacity evaluation will be considered if symptoms persist. He was informed about potential side effects of duloxetine.    Follow-up    His response to duloxetine will be monitored, with treatment adjustments as needed. He will remain off work in the interim, though we are likely looking at an FCE with long term restrictions. That being said given the anatomic improvement seen on MRI his symptoms should improve eventually.  The duloxetine dose may be increased if no side effects and no benefits are observed. He will contact the clinic in 4 weeks and may increase the dose if he is doing well.     40 minutes spent precharting, conducting H&P, reviewing images, discussing treatment options, completing documentation.    Demarco Dixon DO  Physical Medicine and Rehabilitation  [facility]          [1] No Known Allergies

## (undated) NOTE — LETTER
24    Cristobalrufus Calixtotammie  :  1985    To Whom It May Concern:    This patient was seen in our office on 24.  Work status: Remain off work. Recommend work conditioning, and meloxicam. Follow up when working conditioning is done.  Anticipate return to work on  if treatment goes well.     If this office may be of further assistance, please do not hesitate to contact us.      Sincerely,        Demarco Dixon, DO

## (undated) NOTE — LETTER
10/14/24          Cristobalrufus Calixtotammie  :  1985      To Whom It May Concern:    This patient was seen in our office on 10/07/24.  Work status:  Remain off work until re-evaluation at scheduled appointment on 2024      If this office may be of further assistance, please do not hesitate to contact us.      Sincerely,          Demarco Dixon D.O.  [facility] - Physical Medicine & Rehab

## (undated) NOTE — LETTER
24    Cristobalrufus Garcia  :  1985    To Whom It May Concern:    This patient was seen in our office on 24.  Work status:   Remain off work. I recommend left L5 transforaminal epidural steroid injection under fluoroscopy. He will start gabapentin in the meantime. If he does quite well with the gabapentin we can hold off on procedure and he should be seen for follow up in 4 weeks' time. If no better with the gabapentin we should proceed with the injection, and then he should be seen for follow up 2 weeks after the injection is done.     If this office may be of further assistance, please do not hesitate to contact us.      Sincerely,        Demarco Dixon, DO

## (undated) NOTE — LETTER
24          Cristobalrufus Garcia  :  1985      To Whom It May Concern:    This patient is currently under my care. Work status:  Remain off work until re-evaluation at scheduled appointment on 2024      If this office may be of further assistance, please do not hesitate to contact us.      Sincerely,          Demarco Dixon D.O.   [facility] - Physical Medicine & Rehab

## (undated) NOTE — LETTER
WHERE IS YOUR PAIN NOW?  Alicia the areas on your body where you feel the described sensations.  Use the appropriate symbol.  Alicia the areas of radiation.  Include all affected areas.  Just to complete the picture, please draw in the face.     ACHE:  ^ ^ ^   NUMBNESS:  0000   PINS & NEEDLES:  = = = =                              ^ ^ ^                       0000              = = = =                                    ^ ^ ^                       0000            = = = =      BURNING:  XXXX   STABBING: ////                  XXXX                ////                         XXXX          ////     Please alicia the line below indicating your degree of pain right now  with 0 being no pain 10 being the worst pain possible.                                         0             1             2              3             4              5              6              7             8             9             10         Patient Signature:

## (undated) NOTE — LETTER
24          Cristobal Garcia  :  1985      To Whom It May Concern:    This patient is currently under my care for his medical condition. Patient to remain off of work from 2024 - 2024 while he performs physical therapy.      If this office may be of further assistance, please do not hesitate to contact us.      Sincerely,      Demarco Dixon D.O.  [facility] - Physical Medicine & Rehab

## (undated) NOTE — LETTER
09/10/24          Cristobalroselyn Calixtotammie  :  1985      To Whom It May Concern:    This patient was seen in our office on 09/10/24 .  Work status:  {TONA RETURN TO WORK:6959}    May return to work status per above effective ***.    If this office may be of further assistance, please do not hesitate to contact us.      Sincerely,        Dot BARROS RN

## (undated) NOTE — LETTER
25    Cristobal Garcia  :  1985    To Whom It May Concern:    This patient was seen in our office on 25. I recommend a trial of duloxetine and continue home exercise program. He is to contact the clinic in 4 weeks; if no better and no side effects I may increase the duloxetine further. We will need to consider an FCE in 2 months' time. Remain off work for now.     If this office may be of further assistance, please do not hesitate to contact us.    Sincerely,        Demarco Dixon, DO

## (undated) NOTE — LETTER
25          Cristobal Garcia  :  1985      To Whom It May Concern:    This patient was seen in our office on 25. He underwent a procedure today and should contact the clinic for a status update in 1 weeks' time. We will consider return to work at that time.    If this office may be of further assistance, please do not hesitate to contact us.      Sincerely,        Demarco Dixon, DO

## (undated) NOTE — LETTER
24    Cristobal Garcia  :  1985    To Whom It May Concern:    This patient was seen in our office on 24. Some improvement but needs more treatment to be able to tolerate lifting without provoking back pain. Recommend he remain off work and pursue work conditioning for 3-4 times a week, 3 weeks. He should follow up with me once the work conditioning is complete.     If this office may be of further assistance, please do not hesitate to contact us.      Sincerely,        Demarco Dixon, DO

## (undated) NOTE — LETTER
25      Cristobal Garcia  :  1985      To Whom It May Concern:    This patient was seen in our office on 25.  Work status: Recommend bilateral L3-4, L4-5 facet joint steroid injections under fluoroscopy for the axial low back pain; after that anticipate returning him back to work in some capacity. Remain off work for now.     If this office may be of further assistance, please do not hesitate to contact us.      Sincerely,        Demarco Dixon DO

## (undated) NOTE — LETTER
Date: 5/21/2024    Patient Name: Cristobal Garcia          To Whom it may concern:    This letter has been written at the patient's request. The above patient was seen at [facility] for treatment of a medical condition.      The patient should remain off work for the next 6 weeks.         Sincerely,          Lawson Mares MD

## (undated) NOTE — LETTER
04/15/25    Cristobal Grazynabailee  :  1985    To Whom It May Concern:    This patient was seen in our office on 04/15/25.  Work status: Recommend Xray flexion/extension views, MRI of the lumbar spine to evaluate for worsening pathology. He was prescribed gabapentin in the meantime. Follow up to discuss imaging results. Remain off work.     If this office may be of further assistance, please do not hesitate to contact us.      Sincerely,        Demarco Dixon DO

## (undated) NOTE — LETTER
24    Cristobalrufus Calixtotammie  :  1985      To Whom It May Concern:    This patient was seen in our office on 24.  Work status:   Recommend physical therapy, medrol dosepack. He will remain off work until cleared and follow up with me in 2-4 weeks.    If this office may be of further assistance, please do not hesitate to contact us.      Sincerely,        Demarco Dixon, DO

## (undated) NOTE — LETTER
25      Cristobal Garcia  :  1985      To Whom It May Concern:    This patient was seen in our office on 25.  Remain off work. Recommend XR flexion/extension views, meloxicam 15mg x 2 weeks, and if no better L5-S1 interlaminar epidural steroid injection under fluoroscopy, local.    If this office may be of further assistance, please do not hesitate to contact us.      Sincerely,        Demarco Dixon, DO